# Patient Record
Sex: MALE | Race: ASIAN | URBAN - METROPOLITAN AREA
[De-identification: names, ages, dates, MRNs, and addresses within clinical notes are randomized per-mention and may not be internally consistent; named-entity substitution may affect disease eponyms.]

---

## 2018-03-01 ENCOUNTER — OFFICE VISIT - RIVER FALLS (OUTPATIENT)
Dept: FAMILY MEDICINE | Facility: CLINIC | Age: 38
End: 2018-03-01

## 2018-03-01 ASSESSMENT — MIFFLIN-ST. JEOR: SCORE: 1767.74

## 2018-03-02 LAB
CREAT SERPL-MCNC: 0.91 MG/DL (ref 0.6–1.35)
GLUCOSE BLD-MCNC: 96 MG/DL (ref 65–99)

## 2018-03-08 ENCOUNTER — OFFICE VISIT - RIVER FALLS (OUTPATIENT)
Dept: FAMILY MEDICINE | Facility: CLINIC | Age: 38
End: 2018-03-08

## 2018-03-16 ENCOUNTER — AMBULATORY - RIVER FALLS (OUTPATIENT)
Dept: FAMILY MEDICINE | Facility: CLINIC | Age: 38
End: 2018-03-16

## 2018-05-25 ENCOUNTER — OFFICE VISIT - RIVER FALLS (OUTPATIENT)
Dept: FAMILY MEDICINE | Facility: CLINIC | Age: 38
End: 2018-05-25

## 2018-05-25 ASSESSMENT — MIFFLIN-ST. JEOR: SCORE: 1759.58

## 2022-02-12 VITALS
SYSTOLIC BLOOD PRESSURE: 120 MMHG | OXYGEN SATURATION: 97 % | HEART RATE: 76 BPM | SYSTOLIC BLOOD PRESSURE: 104 MMHG | HEIGHT: 70 IN | BODY MASS INDEX: 27.06 KG/M2 | BODY MASS INDEX: 26.59 KG/M2 | DIASTOLIC BLOOD PRESSURE: 80 MMHG | HEIGHT: 70 IN | TEMPERATURE: 98.9 F | DIASTOLIC BLOOD PRESSURE: 60 MMHG | HEART RATE: 99 BPM | WEIGHT: 189 LBS

## 2022-02-12 VITALS
HEIGHT: 70 IN | HEART RATE: 93 BPM | DIASTOLIC BLOOD PRESSURE: 74 MMHG | OXYGEN SATURATION: 97 % | SYSTOLIC BLOOD PRESSURE: 118 MMHG | BODY MASS INDEX: 27.32 KG/M2 | WEIGHT: 190.8 LBS

## 2022-02-16 NOTE — PROGRESS NOTES
Chief Complaint    Patient here for cold, sxs include: difficult to swallow, sore throat, body aches.  History of Present Illness          HPI:          Pt present to clinic today with facial pain and cough. x 3 weeks, tried lemonade and cold medicine without relief, throat and head feel hot, harder to sleep because of the mucous, worsening, laying flat makes the cough worse, no nasuea or vomitting or diarrhea or constipation.                                ROS: Negative except as per HPI.                     Exam:          GEN: alert and oriented x 3 in no acute distress          HEENT:          Head: normo-cephalic and atraumatic                     Eyes: PERRL, EOMI, conjunctiva not injected, no scleral icterus                     Ears:  hearing grossly normal, no otorrhea, TM dull grey with no light reflex                     Nose: no rhinorrhea and positive boggy palenasal mucosa                     Sinus: maxillarynontender                       ethmoid mildly tender                       frontal bilateral moderate tender                      Mouth: mucous membranes moist and pink, positive pharyngeal cobblestoning                     NECK:  supple, no anterior cervical or supraclavicular lymphadenopathy, no nuchal ridgidity                     CHEST: has bilateral rise with no increased work of breathing. clear to auscultation without wheezes, rhonchi, or rales.                     CARDIOVASCULAR: normal perfusion and brisk capillary refill. S1S2 with regular rate and rhythm and no murmurs, gallops or rubs.                     MUSCULOSKELETAL: no gross focal abnormalities and normal gait.                     Neuro: no gross focal abnormalities and memory seems intact.                     Psychiatric: speech is clear and coherent and fluent. Patient dressed appropriately for the weather. Mood is appropriate and affect is full.                                Assessment and Plan:          Acute sinusitis                      also see orders section                                Patient should return to clinic if symptoms worsen or do not improve, and as needed for next annual exam.   Physical Exam   Vitals & Measurements    T: 98.9   F (Tympanic)  HR: 76(Peripheral)  BP: 104/60     HT: 69.75 in  WT: 189.0 lb  BMI: 27.31   Assessment/Plan       Acute sinusitis (J01.10)         Orders:          amoxicillin, 2 cap(s) ( 1,000 mg ), PO, TID, # 60 cap(s), 0 Refill(s), Type: Maintenance, (Ordered)          cetirizine, 1 tab(s) ( 10 mg ), PO, Daily, PRN: for allergy symptoms, # 90 tab(s), 1 Refill(s), Type: Maintenance, (Ordered)          fluticasone nasal, 1 spray(s), nasal, daily, Instructions: in each nostril, # 1 EA, 11 Refill(s), Type: Maintenance, (Ordered)          predniSONE, 1 tab(s) ( 50 mg ), PO, Daily, # 5 tab(s), 0 Refill(s), Type: Maintenance, (Ordered)          [address] office outpatient visit 25 minutes (Charge), Quantity: 1, Acute sinusitis           Patient Information     Name:MARKELL STODDARD      Address:      [address] [place], [place] [address] [address]     Sex:Male     YOB: 1980     [phone]     Emergency Contact:[facility] EMERGENCY, CONTACT     MRN:[id number]     FIN:[id number]     Location:[facility]     Date of Service:05/25/2018      Primary Care Physician:       NONE ,       Attending Physician:       Marquis Cunningham MDica, [phone]  Problem List/Past Medical History    Ongoing     Anxiety     Cervicalgia     Fatigue     Male infertility     Seasonal allergies    Historical     History of chicken pox  Procedure/Surgical History     No previous procedures        Medications     Flonase 50 mcg/inh nasal spray: 2 spray(s), nasal, daily, 1 EA, 3 Refill(s).     testosterone 12.5 mg/actuation (1%) transdermal gel: 50 mg packet, top, qam, 150 gm, 2 Refill(s).     predniSONE 50 mg oral tablet: 50 mg, 1 tab(s), PO, Daily, for 5 day(s), 5 tab(s), 0 Refill(s).     amoxicillin 500 mg  oral capsule: 1,000 mg, 2 cap(s), PO, TID, for 10 day(s), 60 cap(s), 0 Refill(s).     Flonase 50 mcg/inh nasal spray: 1 spray(s), nasal, daily, in each nostril, 1 EA, 11 Refill(s).     ZyrTEC 10 mg oral tablet: 10 mg, 1 tab(s), PO, Daily, PRN: for allergy symptoms, 90 tab(s), 1 Refill(s).          Allergies    No Known Medication Allergies  Social History    Smoking Status - 05/25/2018     Never smoker     Employment and Education      Employed, 10/14/2016     Sexual      Sexually active: No., 03/09/2018     Substance Abuse      Never, 03/09/2018     Tobacco      Never smoker, 10/14/2016  Immunizations      Vaccine Date Status      influenza virus vaccine, inactivated 01/23/2015 Recorded      influenza virus vaccine, inactivated 01/23/2005 Recorded      OPV 01/13/1993 Recorded      Td 12/27/1991 Recorded      OPV 05/23/1991 Recorded      Td 03/23/1991 Recorded      MMR (measles/mumps/rubella) 03/23/1991 Recorded      OPV 03/23/1991 Recorded  Lab Results       Lab Results (Last 4 results within 90 days)        Sodium Level: 141 mmol/L [135 mmol/L - 146 mmol/L] (03/01/18 17:04:00 CST)       Potassium Level: 4.3 mmol/L [3.5 mmol/L - 5.3 mmol/L] (03/01/18 17:04:00 CST)       Chloride Level: 103 mmol/L [98 mmol/L - 110 mmol/L] (03/01/18 17:04:00 CST)       CO2 Level: 32 mmol/L High [20 mmol/L - 31 mmol/L] (03/01/18 17:04:00 CST)       Glucose Level: 96 mg/dL [65 mg/dL - 99 mg/dL] (03/01/18 17:04:00 CST)       BUN: 23 mg/dL [7 mg/dL - 25 mg/dL] (03/01/18 17:04:00 CST)       Creatinine Level: 0.91 mg/dL [0.6 mg/dL - 1.35 mg/dL] (03/01/18 17:04:00 CST)       BUN/Creat Ratio: NOT APPLICABLE [6  - 22] (03/01/18 17:04:00 CST)       eGFR: 106 mL/min/1.73m2 [2 ng/mL - 18 ng/mL] (03/01/18 17:04:00 CST)       eGFR African American: 123 mL/min/1.73m2 [1  - 2.5] (03/01/18 17:04:00 CST)       Calcium Level: 10 mg/dL [8.6 mg/dL - 10.3 mg/dL] (03/01/18 17:04:00 CST)       Bilirubin Total: 0.6 mg/dL [0.2 mg/dL - 1.2 mg/dL] (03/01/18  17:04:00 CST)       Bilirubin Direct: 0.1 mg/dL [40 unit/L - 115 unit/L] (03/01/18 17:04:00 CST)       Bilirubin Indirect: 0.5 [0.2  - 1.2] (03/01/18 17:04:00 CST)       Alkaline Phosphatase: 58 unit/L [40 unit/L - 115 unit/L] (03/01/18 17:04:00 CST)       AST/SGOT: 24 unit/L [10 unit/L - 40 unit/L] (03/01/18 17:04:00 CST)       ALT/SGPT: 50 unit/L High [9 unit/L - 46 unit/L] (03/01/18 17:04:00 CST)       Protein Total: 8.2 gm/dL High [6.1 gm/dL - 8.1 gm/dL] (03/01/18 17:04:00 CST)       Albumin Level: 5.1 gm/dL [3.6 gm/dL - 5.1 gm/dL] (03/01/18 17:04:00 CST)       Globulin: 3.1 [1.9  - 3.7] (03/01/18 17:04:00 CST)       A/G Ratio: 1.6 [1  - 2.5] (03/01/18 17:04:00 CST)       C-Reactive Protein (CRP): 1.4 mg/L [10 unit/L - 40 unit/L] (03/01/18 17:04:00 CST)       TSH: 0.67 mIU/L [0.4 mIU/L - 4.5 mIU/L] (03/01/18 17:04:00 CST)       Prolactin Level: 8.9 ng/mL [2 ng/mL - 18 ng/mL] (03/01/18 17:04:00 CST)       Testosterone Total: 217 ng/dL Low [250 ng/dL - 827 ng/dL] (03/16/18 15:38:00 CDT)       Testosterone Total: 203 ng/dL Low [250 ng/dL - 827 ng/dL] (03/01/18 17:04:00 CST)       WBC: 12.1 High [3.8  - 10.8] (03/01/18 17:04:00 CST)       RBC: 5.67 [4.2  - 5.8] (03/01/18 17:04:00 CST)       Hgb: 16.4 gm/dL [13.2 gm/dL - 17.1 gm/dL] (03/01/18 17:04:00 CST)       Hct: 47.1 % [38.5 % - 50 %] (03/01/18 17:04:00 CST)       MCV: 83.1 fL [80 fL - 100 fL] (03/01/18 17:04:00 CST)       MCH: 28.9 pg [27 pg - 33 pg] (03/01/18 17:04:00 CST)       MCHC: 34.8 gm/dL [32 gm/dL - 36 gm/dL] (03/01/18 17:04:00 CST)       RDW: 13 % [11 % - 15 %] (03/01/18 17:04:00 CST)       Platelet: 312 [140  - 400] (03/01/18 17:04:00 CST)       MPV: 10.8 fL [7.5 fL - 12.5 fL] (03/01/18 17:04:00 CST)       Sed Rate: 2 [11 % - 15 %] (03/01/18 17:04:00 CST)

## 2022-02-16 NOTE — PROGRESS NOTES
Patient:   MARKELL STODDARD            MRN: [id number]            FIN: [id number]               Age:   38 years     Sex:  Male     :  1980   Associated Diagnoses:   Annual physical exam; Muscle ache; Fatigue; Neck pain; Infertility male; Anxiety; Seasonal allergies   Author:   Kyrie Leslie MD      Chief Complaint   3/1/2018 3:56 PM CST     pt here for a physical      History of Present Illness   see chief complaint as noted above and confirmed with the patient     38 year old male presents today for an annual exam. He has not been seen for a few years.     Fatigue: He feel's very tired after work, questions if there could be an underlying issue.     Pain: Say's he has pain in his joint's, his back. He has pain in his shoulders on and off, he works at [employer] and is on his feet most of the day, does have some pain and soreness in his feet after working.     Fall: When he was a child he took a fall and say's he has to have surgery on the back of his head, unknown what kind of surgery, he had this done in [place] where he lived until he was age [age] and then he came to [place].     Seasonal allergies: Say's he seems to have worsening allergies in the winter, he has a lot of mucous in his nose and back of his throat, he says he does take an allergy pill sometimes to help with allergies, and he has taken a nasal spray in the past with some improvment.     Anxiety/Depression: He has had depression in the past, say's he does feel sad sometimes, feels more anxious, he say's his hand's shake sometimes.       Review of Systems   Constitutional:  No fever.    Respiratory:  No shortness of breath, No cough.    Gastrointestinal:  No nausea, No vomiting, No diarrhea.    Integumentary:  No rash.    Neurologic:  Alert and oriented X4, No headache.    Psychiatric:  Anxiety.             Health Status   Allergies:    Allergic Reactions (Selected)  No Known Medication Allergies   Medications:  (Selected)      Problem list:     All Problems  Seasonal allergies / SNOMED CT 095534794 / Confirmed  Resolved: History of chicken pox / SNOMED CT F130HVK4-6624--[id number]  Resolved: Depression / SNOMED CT 35144648      Histories   Past Medical History:    Active  Seasonal allergies (612560720)  Resolved  Depression (75625200):  Resolved.  History of chicken pox (Z039LCP5-5189--[id number]):  Resolved.   Family History:    No family history items have been selected or recorded.   Procedure history:    No previous procedures.   Social History:        Tobacco Assessment            Never smoker      Employment and Education Assessment            Employed                     Comments:                      10/14/2016 - [employer]        Physical Examination   Vital Signs   3/1/2018 3:56 PM CST Peripheral Pulse Rate 93 bpm    Pulse Site Radial artery    Systolic Blood Pressure 118 mmHg    Diastolic Blood Pressure 74 mmHg    Mean Arterial Pressure 89 mmHg    BP Site Right arm    Oxygen Saturation 97 %      Measurements from flowsheet : Measurements   3/1/2018 3:56 PM CST Height Measured - Standard 69.75 in    Weight Measured - Standard 190.8 lb    BSA 2.06 m2    Body Mass Index 27.57 kg/m2  HI      General:  Alert and oriented, No acute distress.    Eye:  Pupils are equal, round and reactive to light, Normal conjunctiva.    HENT:  Normocephalic, Tympanic membranes are clear, Oral mucosa is moist, No pharyngeal erythema.    Neck:  Supple, Non-tender, No lymphadenopathy.    Respiratory:  Lungs are clear to auscultation, Respirations are non-labored.    Cardiovascular:  Normal rate, Regular rhythm, No edema.    Gastrointestinal:  Soft, Non-tender, Non-distended, No organomegaly.         Rectum/ anus: Normal tone, Stool ( Within normal limits ).    Musculoskeletal:  Normal range of motion, Normal strength, No swelling, Normal gait.    Integumentary:  Warm, No rash.    Neurologic:  Alert,  Oriented.    Psychiatric:  Cooperative, Appropriate mood & affect, Normal judgment.       Review / Management   Results review:  Lab results   3/1/2018 5:04 PM CST Sodium Level 141 mmol/L    Potassium Level 4.3 mmol/L    Chloride Level 103 mmol/L    CO2 Level 32 mmol/L  HI    Glucose Level 96 mg/dL    BUN 23 mg/dL    Creatinine Level 0.91 mg/dL    BUN/Creat Ratio NOT APPLICABLE    eGFR 106 mL/min/1.73m2    eGFR African American 123 mL/min/1.73m2    Calcium Level 10.0 mg/dL    Bilirubin Total 0.6 mg/dL    Bilirubin Direct 0.1 mg/dL    Bilirubin Indirect 0.5    Alkaline Phosphatase 58 unit/L    AST/SGOT 24 unit/L    ALT/SGPT 50 unit/L  HI    Protein Total 8.2 gm/dL  HI    Albumin Level 5.1 gm/dL    Globulin 3.1    A/G Ratio 1.6    C-Reactive Protein (CRP) 1.4 mg/L    TSH 0.67 mIU/L    Prolactin Level 8.9 ng/mL    Testosterone Total 203 ng/dL  LOW    WBC 12.1  HI    RBC 5.67    Hgb 16.4 gm/dL    Hct 47.1 %    MCV 83.1 fL    MCH 28.9 pg    MCHC 34.8 gm/dL    RDW 13.0 %    Platelet 312    MPV 10.8 fL    Sed Rate 2   .       Impression and Plan       Diagnosis     Annual physical exam (CSL32-DO Z00.00).     Muscle ache (YLE98-RZ M79.1).     Fatigue (BVI01-KM R53.83).     Neck pain (MAK72-QQ M54.2).     Infertility male (YHV16-OE N46.9).     Anxiety (GVM96-MX F41.9).     Seasonal allergies (SQR81-CG J30.2).     Course:  Progressing as expected.    Plan:  Will send in Flonase nasal spray to use daily for allergy symptoms.     Reffered to urologist for infertility    asked to return to discuss lab work next week.     Normal neck x-ray     discussed cymbalta for the muscle and neck pains, will wait for lab work to see if that shows anything. .    [facility] Assistant acted solely as a scribe for, and in presence of Dr. Kyrie Leslie who performed the services.

## 2022-02-16 NOTE — PROGRESS NOTES
Patient:   MARKELL STODDARD            MRN: [id number]            FIN: [id number]               Age:   38 years     Sex:  Male     :  1980   Associated Diagnoses:   Hypogonadism male   Author:   Kyrie Leslie MD      Chief Complaint   3/8/2018 3:19 PM CST     fu labs      History of Present Illness   see chief complaint as noted above and confirmed with the patient     38 year old male here today following up with recent lab work done. His recent labs show low testosterone. At his last visit he informed us he has been trying to have a child and has been unable to do so and unsure why. He was also complaining of fatigue and muscle aches at visit last week. All other lab work that was done was okay.       Review of Systems   Constitutional:  Fatigue, No fever.    Integumentary:  No rash.    Neurologic:  Alert and oriented X4.             Health Status   Allergies:    Allergic Reactions (Selected)  No Known Medication Allergies   Medications:  (Selected)   Prescriptions  Prescribed  Flonase 50 mcg/inh nasal spray: 2 spray(s), nasal, daily, # 1 EA, 3 Refill(s), Type: Maintenance, Pharmacy: [facility], 2 spray(s) nasal daily   Problem list:    All Problems  Seasonal allergies / SNOMED CT 079016649 / Confirmed  Resolved: History of chicken pox / SNOMED CT E325UYU9-1663--[id number]  Resolved: Depression / SNOMED CT 27029164      Histories   Past Medical History:    Active  Seasonal allergies ([id number])  Resolved  Depression (20205596):  Resolved.  History of chicken pox (K206MVE6-8491--[id number]):  Resolved.   Family History:    No family history items have been selected or recorded.   Procedure history:    No previous procedures.   Social History:        Tobacco Assessment            Never smoker      Employment and Education Assessment            Employed                     Comments:                      10/14/2016 - [employer], [employer]                              Physical Examination   Vital Signs   3/8/2018 3:19 PM CST Peripheral Pulse Rate 99 bpm    Pulse Site Radial artery    Systolic Blood Pressure 120 mmHg    Diastolic Blood Pressure 80 mmHg    Mean Arterial Pressure 93 mmHg    BP Site Right arm    Oxygen Saturation 97 %      Measurements from flowsheet : Measurements   3/8/2018 3:19 PM CST     Height Measured - Standard                69.75 in     General:  Alert and oriented, No acute distress.    Eye:  Pupils are equal, round and reactive to light, Normal conjunctiva.    HENT:  Oral mucosa is moist.    Neck:  Supple.    Respiratory:  Respirations are non-labored.    Cardiovascular:  Normal rate, Regular rhythm, No edema.    Gastrointestinal:  Non-distended.    Musculoskeletal:  Normal gait.    Integumentary:  Warm, No rash.    Neurologic:  Alert, Oriented.    Psychiatric:  Cooperative, Appropriate mood & affect, Normal judgment.       Review / Management   Results review:  Lab results   3/1/2018 5:04 PM CST Sodium Level 141 mmol/L    Potassium Level 4.3 mmol/L    Chloride Level 103 mmol/L    CO2 Level 32 mmol/L  HI    Glucose Level 96 mg/dL    BUN 23 mg/dL    Creatinine Level 0.91 mg/dL    BUN/Creat Ratio NOT APPLICABLE    eGFR 106 mL/min/1.73m2    eGFR African American 123 mL/min/1.73m2    Calcium Level 10.0 mg/dL    Bilirubin Total 0.6 mg/dL    Bilirubin Direct 0.1 mg/dL    Bilirubin Indirect 0.5    Alkaline Phosphatase 58 unit/L    AST/SGOT 24 unit/L    ALT/SGPT 50 unit/L  HI    Protein Total 8.2 gm/dL  HI    Albumin Level 5.1 gm/dL    Globulin 3.1    A/G Ratio 1.6    C-Reactive Protein (CRP) 1.4 mg/L    TSH 0.67 mIU/L    Prolactin Level 8.9 ng/mL    Testosterone Total 203 ng/dL  LOW    WBC 12.1  HI    RBC 5.67    Hgb 16.4 gm/dL    Hct 47.1 %    MCV 83.1 fL    MCH 28.9 pg    MCHC 34.8 gm/dL    RDW 13.0 %    Platelet 312    MPV 10.8 fL    Sed Rate 2   .       Impression and Plan       Diagnosis     Hypogonadism male (MFW71-WO E29.1).     Plan:  Will send in  Androgel to the pharmacy, this is used to treat low testosterone level. Pick the prescription up at the pharmacy.     Would like you to come back in two months for a lab appointment to repeat the testosterone level to see if the gel is helping you.     Keep appointment with urologist for further assesment.     .    I, [facility] Medical Assistant acted solely as a scribe for, and in presence of Dr. Kyrie Leslie who performed the services.